# Patient Record
Sex: FEMALE | Race: WHITE | NOT HISPANIC OR LATINO | URBAN - METROPOLITAN AREA
[De-identification: names, ages, dates, MRNs, and addresses within clinical notes are randomized per-mention and may not be internally consistent; named-entity substitution may affect disease eponyms.]

---

## 2017-08-08 ENCOUNTER — EMERGENCY (EMERGENCY)
Facility: HOSPITAL | Age: 37
LOS: 1 days | Discharge: HOME | End: 2017-08-08
Attending: EMERGENCY MEDICINE | Admitting: EMERGENCY MEDICINE
Payer: COMMERCIAL

## 2017-08-08 VITALS
RESPIRATION RATE: 16 BRPM | TEMPERATURE: 98 F | WEIGHT: 125 LBS | OXYGEN SATURATION: 97 % | HEIGHT: 66 IN | DIASTOLIC BLOOD PRESSURE: 63 MMHG | SYSTOLIC BLOOD PRESSURE: 103 MMHG | HEART RATE: 59 BPM

## 2017-08-08 DIAGNOSIS — M25.532 PAIN IN LEFT WRIST: ICD-10-CM
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
## 2017-08-08 DIAGNOSIS — S63.502A UNSPECIFIED SPRAIN OF LEFT WRIST, INITIAL ENCOUNTER: ICD-10-CM
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
## 2017-08-08 PROCEDURE — 99283 EMERGENCY DEPT VISIT LOW MDM: CPT

## 2017-08-08 RX ORDER — IBUPROFEN 200 MG
600 TABLET ORAL ONCE
Qty: 0 | Refills: 0 | Status: COMPLETED | OUTPATIENT
Start: 2017-08-08 | End: 2017-08-08

## 2017-08-08 RX ADMIN — Medication 600 MILLIGRAM(S): at 09:25

## 2017-08-08 RX ADMIN — Medication 600 MILLIGRAM(S): at 09:31

## 2017-08-08 NOTE — ED PROVIDER NOTE - PHYSICAL EXAMINATION
CONSTITUTIONAL: Well-appearing; well-nourished; in no apparent distress.   HEAD: Normocephalic; atraumatic.   EYES:  conjunctiva and sclera clear  ENT: normal nose; no rhinorrhea; normal pharynx with no erythema or lesions.   NECK: Supple; non-tender;   RESPIRATORY: Breathing easily  EXT: No cyanosis or edema; N/V intact. L wrist with full ROM, no edema, no tenderness over scaphoid or w/ axial loading. no overlying erythema.   SKIN: Normal for age and race; warm; dry; good turgor; no apparent lesions or rash.   NEURO: A & O x 3; face symmetric; grossly unremarkable.   PSYCHOLOGICAL: The patient’s mood and manner are appropriate.

## 2017-08-08 NOTE — ED PROVIDER NOTE - OBJECTIVE STATEMENT
36yo F here w/ L wrist pain after doing weights at the gym x2 days. Wearing a brace which helps. Has not taken any medications. Did not have pain while doing weights, and no trauma to the wrist. Gradual onset. No redness, no swelling. Denies any lacerations/abrasions. No other joints that are sore.

## 2017-08-08 NOTE — ED ADULT TRIAGE NOTE - OTHER COMPLAINTS
pt c.o L wrist pain x 1 day. pt admits to lifting weights yesterday and reports 7/10 pain today. denies swelling or bruising. soft brace in place.
